# Patient Record
Sex: FEMALE
[De-identification: names, ages, dates, MRNs, and addresses within clinical notes are randomized per-mention and may not be internally consistent; named-entity substitution may affect disease eponyms.]

---

## 2021-10-14 ENCOUNTER — RESULT REVIEW (OUTPATIENT)
Age: 36
End: 2021-10-14

## 2021-11-08 PROBLEM — Z00.00 ENCOUNTER FOR PREVENTIVE HEALTH EXAMINATION: Status: ACTIVE | Noted: 2021-11-08

## 2021-11-11 ENCOUNTER — APPOINTMENT (OUTPATIENT)
Dept: ANTEPARTUM | Facility: CLINIC | Age: 36
End: 2021-11-11
Payer: COMMERCIAL

## 2021-11-11 ENCOUNTER — ASOB RESULT (OUTPATIENT)
Age: 36
End: 2021-11-11

## 2021-11-11 PROCEDURE — 76801 OB US < 14 WKS SINGLE FETUS: CPT

## 2021-11-11 PROCEDURE — 99204 OFFICE O/P NEW MOD 45 MIN: CPT | Mod: 25

## 2021-12-30 ENCOUNTER — ASOB RESULT (OUTPATIENT)
Age: 36
End: 2021-12-30

## 2021-12-30 ENCOUNTER — APPOINTMENT (OUTPATIENT)
Dept: ANTEPARTUM | Facility: CLINIC | Age: 36
End: 2021-12-30
Payer: COMMERCIAL

## 2021-12-30 ENCOUNTER — APPOINTMENT (OUTPATIENT)
Dept: ANTEPARTUM | Facility: CLINIC | Age: 36
End: 2021-12-30

## 2021-12-30 PROCEDURE — 76811 OB US DETAILED SNGL FETUS: CPT

## 2021-12-31 ENCOUNTER — TRANSCRIPTION ENCOUNTER (OUTPATIENT)
Age: 36
End: 2021-12-31

## 2022-02-03 ENCOUNTER — APPOINTMENT (OUTPATIENT)
Dept: ANTEPARTUM | Facility: CLINIC | Age: 37
End: 2022-02-03
Payer: COMMERCIAL

## 2022-02-03 ENCOUNTER — ASOB RESULT (OUTPATIENT)
Age: 37
End: 2022-02-03

## 2022-02-03 PROCEDURE — 76816 OB US FOLLOW-UP PER FETUS: CPT

## 2022-03-17 ENCOUNTER — APPOINTMENT (OUTPATIENT)
Dept: ANTEPARTUM | Facility: CLINIC | Age: 37
End: 2022-03-17
Payer: COMMERCIAL

## 2022-03-17 ENCOUNTER — ASOB RESULT (OUTPATIENT)
Age: 37
End: 2022-03-17

## 2022-03-17 PROCEDURE — 76819 FETAL BIOPHYS PROFIL W/O NST: CPT

## 2022-03-17 PROCEDURE — 76816 OB US FOLLOW-UP PER FETUS: CPT

## 2022-04-28 ENCOUNTER — APPOINTMENT (OUTPATIENT)
Dept: ANTEPARTUM | Facility: CLINIC | Age: 37
End: 2022-04-28

## 2022-05-24 ENCOUNTER — OUTPATIENT (OUTPATIENT)
Dept: OUTPATIENT SERVICES | Facility: HOSPITAL | Age: 37
LOS: 1 days | End: 2022-05-24
Payer: COMMERCIAL

## 2022-05-24 DIAGNOSIS — Z11.52 ENCOUNTER FOR SCREENING FOR COVID-19: ICD-10-CM

## 2022-05-24 PROCEDURE — U0005: CPT

## 2022-05-24 PROCEDURE — U0003: CPT

## 2022-05-24 PROCEDURE — C9803: CPT

## 2022-05-25 LAB — SARS-COV-2 RNA SPEC QL NAA+PROBE: SIGNIFICANT CHANGE UP

## 2022-05-26 ENCOUNTER — INPATIENT (INPATIENT)
Facility: HOSPITAL | Age: 37
LOS: 2 days | Discharge: HOME | End: 2022-05-29
Attending: OBSTETRICS & GYNECOLOGY | Admitting: OBSTETRICS & GYNECOLOGY
Payer: COMMERCIAL

## 2022-05-26 VITALS — OXYGEN SATURATION: 98 % | HEART RATE: 95 BPM

## 2022-05-26 DIAGNOSIS — O48.0 POST-TERM PREGNANCY: ICD-10-CM

## 2022-05-26 LAB
BASOPHILS # BLD AUTO: 0.03 K/UL — SIGNIFICANT CHANGE UP (ref 0–0.2)
BASOPHILS NFR BLD AUTO: 0.3 % — SIGNIFICANT CHANGE UP (ref 0–2)
BLD GP AB SCN SERPL QL: NEGATIVE — SIGNIFICANT CHANGE UP
EOSINOPHIL # BLD AUTO: 0.14 K/UL — SIGNIFICANT CHANGE UP (ref 0–0.5)
EOSINOPHIL NFR BLD AUTO: 1.2 % — SIGNIFICANT CHANGE UP (ref 0–6)
HCT VFR BLD CALC: 38.4 % — SIGNIFICANT CHANGE UP (ref 34.5–45)
HGB BLD-MCNC: 12.8 G/DL — SIGNIFICANT CHANGE UP (ref 11.5–15.5)
IMM GRANULOCYTES NFR BLD AUTO: 1 % — SIGNIFICANT CHANGE UP (ref 0–1.5)
LYMPHOCYTES # BLD AUTO: 1.65 K/UL — SIGNIFICANT CHANGE UP (ref 1–3.3)
LYMPHOCYTES # BLD AUTO: 14.3 % — SIGNIFICANT CHANGE UP (ref 13–44)
MCHC RBC-ENTMCNC: 31.4 PG — SIGNIFICANT CHANGE UP (ref 27–34)
MCHC RBC-ENTMCNC: 33.3 GM/DL — SIGNIFICANT CHANGE UP (ref 32–36)
MCV RBC AUTO: 94.1 FL — SIGNIFICANT CHANGE UP (ref 80–100)
MONOCYTES # BLD AUTO: 0.98 K/UL — HIGH (ref 0–0.9)
MONOCYTES NFR BLD AUTO: 8.5 % — SIGNIFICANT CHANGE UP (ref 2–14)
NEUTROPHILS # BLD AUTO: 8.64 K/UL — HIGH (ref 1.8–7.4)
NEUTROPHILS NFR BLD AUTO: 74.7 % — SIGNIFICANT CHANGE UP (ref 43–77)
NRBC # BLD: 0 /100 WBCS — SIGNIFICANT CHANGE UP (ref 0–0)
PLATELET # BLD AUTO: 291 K/UL — SIGNIFICANT CHANGE UP (ref 150–400)
RBC # BLD: 4.08 M/UL — SIGNIFICANT CHANGE UP (ref 3.8–5.2)
RBC # FLD: 13.5 % — SIGNIFICANT CHANGE UP (ref 10.3–14.5)
RH IG SCN BLD-IMP: POSITIVE — SIGNIFICANT CHANGE UP
WBC # BLD: 11.55 K/UL — HIGH (ref 3.8–10.5)
WBC # FLD AUTO: 11.55 K/UL — HIGH (ref 3.8–10.5)

## 2022-05-26 RX ORDER — AMPICILLIN TRIHYDRATE 250 MG
1 CAPSULE ORAL EVERY 4 HOURS
Refills: 0 | Status: DISCONTINUED | OUTPATIENT
Start: 2022-05-26 | End: 2022-05-27

## 2022-05-26 RX ORDER — SODIUM CHLORIDE 9 MG/ML
1000 INJECTION, SOLUTION INTRAVENOUS
Refills: 0 | Status: DISCONTINUED | OUTPATIENT
Start: 2022-05-26 | End: 2022-05-27

## 2022-05-26 RX ORDER — CITRIC ACID/SODIUM CITRATE 300-500 MG
15 SOLUTION, ORAL ORAL EVERY 6 HOURS
Refills: 0 | Status: DISCONTINUED | OUTPATIENT
Start: 2022-05-26 | End: 2022-05-27

## 2022-05-26 RX ORDER — OXYTOCIN 10 UNIT/ML
333.33 VIAL (ML) INJECTION
Qty: 20 | Refills: 0 | Status: DISCONTINUED | OUTPATIENT
Start: 2022-05-26 | End: 2022-05-27

## 2022-05-26 RX ORDER — AMPICILLIN TRIHYDRATE 250 MG
2 CAPSULE ORAL ONCE
Refills: 0 | Status: COMPLETED | OUTPATIENT
Start: 2022-05-26 | End: 2022-05-26

## 2022-05-26 RX ADMIN — Medication 108 GRAM(S): at 22:35

## 2022-05-26 RX ADMIN — Medication 200 GRAM(S): at 18:21

## 2022-05-26 NOTE — OB RN PATIENT PROFILE - FALL HARM RISK - UNIVERSAL INTERVENTIONS
Bed in lowest position, wheels locked, appropriate side rails in place/Call bell, personal items and telephone in reach/Instruct patient to call for assistance before getting out of bed or chair/Non-slip footwear when patient is out of bed/[place] to call system/Physically safe environment - no spills, clutter or unnecessary equipment/Purposeful Proactive Rounding/Room/bathroom lighting operational, light cord in reach

## 2022-05-26 NOTE — OB PROVIDER H&P - NS ATTEST RISK PROBLEM GEN_ALL_CORE FT
Note written remotely:  35 yo  presents at 40 6/7 weeks for late term labor induction. Pregnancy has been uncomplicated. She is GBS neg.  Pt was seen and admitted by the PA  Tracing reviewed remotely:  NST: Reactive, moderate variability, accels present, no decels  toco: occasional ctx  A/P: Late Term induction at 40 6/7 weeks  -GBS neg  -po cytotec  -cervical jimenes at MN  -pitocin at 4 AM  Derek Adair MD

## 2022-05-26 NOTE — OB RN PATIENT PROFILE - EDUCATION PROVIDED ON ASSESSMENT OF INFANT "FEEDING CUES" AND THE IMPORTANCE OF FEEDING "ON CUE" / "BABY-LED" FEEDINGS
Appointment scheduled for 2:30 today
Patient is not feeling well. She thinks she has a sinus infection. She is home today and would like to see him today.
Statement Selected

## 2022-05-26 NOTE — OB PROVIDER H&P - ASSESSMENT
37yo  @40w6d IOL for late term. Category I tracing. Vitals stable.    Plan:  -Admit to L&D  -Admission labs  -IVF  -EFM/[place]  -Monitor vitals  -For PO cytotec  -For cook balloon at midnight  -For pitocin @4a  -Anticipate     Plan per Dr. Mana SUE)    Odette Guzman PA-C

## 2022-05-26 NOTE — OB PROVIDER H&P - HISTORY OF PRESENT ILLNESS
35yo  @40w6d presenting for IOL for late term. Has been feeling irregular contractions. Denies LOF, vaginal bleeding. Endorses good fetal movement. Reports an otherwise uncomplicated pregnancy.    GBS positive  EFW 3400    OBHx: Primigravida  GYNHx: H/o abnormal pap smear, s/p colpo in , f/up pap smears WNL    MHx:     Mitral valve prolapse - follows with Dr. Barone ([facility]). Last saw her 2022, EKG, echo, and nonstress test WNL.   SHx: Denies  SocialHx: Denies  PsychHx: Denies    Allergies: Denies  Meds: PNV, ASA

## 2022-05-26 NOTE — OB PROVIDER H&P - NSHPPHYSICALEXAM_GEN_ALL_CORE
ICU Vital Signs Last 24 Hrs  T(C): --  T(F): --  HR: 77 (26 May 2022 17:55) (73 - 95)  BP: 127/61 (26 May 2022 17:28) (127/61 - 127/61)  BP(mean): --  ABP: --  ABP(mean): --  RR: --  SpO2: 99% (26 May 2022 17:55) (93% - 99%)    Physical Exam:  General: NAD, well appearing  Heart: RRR  Lungs: CTA b/l  Abdomen: Gravid, NT, NR, NG  Extremities: No calf tenderness b/l    SVE: 0/50/-3    Sono (Dr. Masters): Vertex

## 2022-05-27 LAB
COVID-19 SPIKE DOMAIN AB INTERP: POSITIVE
COVID-19 SPIKE DOMAIN ANTIBODY RESULT: >250 U/ML — HIGH
SARS-COV-2 IGG+IGM SERPL QL IA: >250 U/ML — HIGH
SARS-COV-2 IGG+IGM SERPL QL IA: POSITIVE
T PALLIDUM AB TITR SER: NEGATIVE — SIGNIFICANT CHANGE UP

## 2022-05-27 RX ORDER — OXYTOCIN 10 UNIT/ML
4 VIAL (ML) INJECTION
Qty: 30 | Refills: 0 | Status: DISCONTINUED | OUTPATIENT
Start: 2022-05-27 | End: 2022-05-29

## 2022-05-27 RX ORDER — DIBUCAINE 1 %
1 OINTMENT (GRAM) RECTAL EVERY 6 HOURS
Refills: 0 | Status: DISCONTINUED | OUTPATIENT
Start: 2022-05-27 | End: 2022-05-29

## 2022-05-27 RX ORDER — OXYTOCIN 10 UNIT/ML
10 VIAL (ML) INJECTION ONCE
Refills: 0 | Status: COMPLETED | OUTPATIENT
Start: 2022-05-27 | End: 2022-05-27

## 2022-05-27 RX ORDER — DIPHENHYDRAMINE HCL 50 MG
25 CAPSULE ORAL EVERY 6 HOURS
Refills: 0 | Status: DISCONTINUED | OUTPATIENT
Start: 2022-05-27 | End: 2022-05-29

## 2022-05-27 RX ORDER — OXYCODONE HYDROCHLORIDE 5 MG/1
5 TABLET ORAL ONCE
Refills: 0 | Status: DISCONTINUED | OUTPATIENT
Start: 2022-05-27 | End: 2022-05-29

## 2022-05-27 RX ORDER — TETANUS TOXOID, REDUCED DIPHTHERIA TOXOID AND ACELLULAR PERTUSSIS VACCINE, ADSORBED 5; 2.5; 8; 8; 2.5 [IU]/.5ML; [IU]/.5ML; UG/.5ML; UG/.5ML; UG/.5ML
0.5 SUSPENSION INTRAMUSCULAR ONCE
Refills: 0 | Status: DISCONTINUED | OUTPATIENT
Start: 2022-05-27 | End: 2022-05-29

## 2022-05-27 RX ORDER — SODIUM CHLORIDE 9 MG/ML
3 INJECTION INTRAMUSCULAR; INTRAVENOUS; SUBCUTANEOUS EVERY 8 HOURS
Refills: 0 | Status: DISCONTINUED | OUTPATIENT
Start: 2022-05-27 | End: 2022-05-29

## 2022-05-27 RX ORDER — ACETAMINOPHEN 500 MG
975 TABLET ORAL
Refills: 0 | Status: DISCONTINUED | OUTPATIENT
Start: 2022-05-27 | End: 2022-05-29

## 2022-05-27 RX ORDER — OXYCODONE HYDROCHLORIDE 5 MG/1
5 TABLET ORAL
Refills: 0 | Status: DISCONTINUED | OUTPATIENT
Start: 2022-05-27 | End: 2022-05-29

## 2022-05-27 RX ORDER — BENZOCAINE 10 %
1 GEL (GRAM) MUCOUS MEMBRANE EVERY 6 HOURS
Refills: 0 | Status: DISCONTINUED | OUTPATIENT
Start: 2022-05-27 | End: 2022-05-29

## 2022-05-27 RX ORDER — AER TRAVELER 0.5 G/1
1 SOLUTION RECTAL; TOPICAL EVERY 4 HOURS
Refills: 0 | Status: DISCONTINUED | OUTPATIENT
Start: 2022-05-27 | End: 2022-05-29

## 2022-05-27 RX ORDER — OXYTOCIN 10 UNIT/ML
333.33 VIAL (ML) INJECTION
Qty: 20 | Refills: 0 | Status: DISCONTINUED | OUTPATIENT
Start: 2022-05-27 | End: 2022-05-29

## 2022-05-27 RX ORDER — KETOROLAC TROMETHAMINE 30 MG/ML
30 SYRINGE (ML) INJECTION ONCE
Refills: 0 | Status: DISCONTINUED | OUTPATIENT
Start: 2022-05-27 | End: 2022-05-29

## 2022-05-27 RX ORDER — MAGNESIUM HYDROXIDE 400 MG/1
30 TABLET, CHEWABLE ORAL
Refills: 0 | Status: DISCONTINUED | OUTPATIENT
Start: 2022-05-27 | End: 2022-05-29

## 2022-05-27 RX ORDER — IBUPROFEN 200 MG
600 TABLET ORAL EVERY 6 HOURS
Refills: 0 | Status: COMPLETED | OUTPATIENT
Start: 2022-05-27 | End: 2023-04-25

## 2022-05-27 RX ORDER — LANOLIN
1 OINTMENT (GRAM) TOPICAL EVERY 6 HOURS
Refills: 0 | Status: DISCONTINUED | OUTPATIENT
Start: 2022-05-27 | End: 2022-05-29

## 2022-05-27 RX ORDER — SIMETHICONE 80 MG/1
80 TABLET, CHEWABLE ORAL EVERY 4 HOURS
Refills: 0 | Status: DISCONTINUED | OUTPATIENT
Start: 2022-05-27 | End: 2022-05-29

## 2022-05-27 RX ORDER — HYDROCORTISONE 1 %
1 OINTMENT (GRAM) TOPICAL EVERY 6 HOURS
Refills: 0 | Status: DISCONTINUED | OUTPATIENT
Start: 2022-05-27 | End: 2022-05-29

## 2022-05-27 RX ORDER — PRAMOXINE HYDROCHLORIDE 150 MG/15G
1 AEROSOL, FOAM RECTAL EVERY 4 HOURS
Refills: 0 | Status: DISCONTINUED | OUTPATIENT
Start: 2022-05-27 | End: 2022-05-29

## 2022-05-27 RX ADMIN — SODIUM CHLORIDE 125 MILLILITER(S): 9 INJECTION, SOLUTION INTRAVENOUS at 04:30

## 2022-05-27 RX ADMIN — Medication 108 GRAM(S): at 15:25

## 2022-05-27 RX ADMIN — Medication 108 GRAM(S): at 11:15

## 2022-05-27 RX ADMIN — Medication 108 GRAM(S): at 06:30

## 2022-05-27 RX ADMIN — Medication 4 MILLIUNIT(S)/MIN: at 13:45

## 2022-05-27 RX ADMIN — Medication 10 UNIT(S): at 21:32

## 2022-05-27 RX ADMIN — Medication 108 GRAM(S): at 02:30

## 2022-05-27 RX ADMIN — Medication 108 GRAM(S): at 19:30

## 2022-05-27 NOTE — OB RN DELIVERY SUMMARY - NSSELHIDDEN_OBGYN_ALL_OB_FT
[NS_DeliveryAttending1_OBGYN_ALL_OB_FT:[id number]],[NS_DeliveryRN_OBGYN_ALL_OB_FT:[id number]]

## 2022-05-27 NOTE — OB PROVIDER LABOR PROGRESS NOTE - NS_OBIHIFHRDETAILS_OBGYN_ALL_OB_FT
120/mod/+accels no decels
140's moderate variability + accels no decels
120/mod/+accels no decels
140/mod/+acc/-dec
125/mod/-acc/-dec

## 2022-05-27 NOTE — OB PROVIDER LABOR PROGRESS NOTE - NS_SUBJECTIVE/OBJECTIVE_OBGYN_ALL_OB_FT
OB attending progress note     Patient signed out to me by Dr. Adair overnight    35yo  @40w6d presenting for IOL for late term. Has been feeling irregular contractions. Denies LOF, vaginal bleeding. Endorses good fetal movement. Reports an otherwise uncomplicated pregnancy.  OB issues:   -Hx MVP, follows with cardiologist, evaluated for chest pain a few times this pregnancy, work up unremarkable  -Hx of Trisomy X on NIPT, s/p genetics, declined invasive testing, sonograms wnl  -Hx of GBS pos    Vital Signs Last 24 Hrs  T(C): 36.7 (27 May 2022 10:08), Max: 37 (26 May 2022 18:22)  T(F): 98.06 (27 May 2022 10:08), Max: 98.6 (26 May 2022 18:22)  HR: 81 (27 May 2022 11:35) (58 - 107)  BP: 110/68 (27 May 2022 11:35) (107/66 - 132/66)  BP(mean): --  RR: 18 (27 May 2022 02:54) (17 - 18)  SpO2: 98% (27 May 2022 11:35) (90% - 100%)    SVE deferred  EFW 3700g    Labs reviewed:                         12.8   11.55 )-----------( 291      ( 26 May 2022 19:29 )             38.4     O+/ab neg   GBS pos  Covid neg
pt due for exam.
OB attending Progress note     Patient examined for cervical change    Vital Signs Last 24 Hrs  T(C): 36.7 (27 May 2022 15:26), Max: 37 (26 May 2022 18:22)  T(F): 98.06 (27 May 2022 15:26), Max: 98.6 (26 May 2022 18:22)  HR: 86 (27 May 2022 16:45) (58 - 107)  BP: 124/60 (27 May 2022 16:36) (92/54 - 143/93)  BP(mean): --  RR: 18 (27 May 2022 02:54) (17 - 18)  SpO2: 99% (27 May 2022 16:45) (90% - 100%)    SVE, balloon out, AROM, clear
Patient comfortable    T(C): 36.7 (05-27-22 @ 10:08), Max: 37 (05-26-22 @ 18:22)  HR: 70 (05-27-22 @ 13:15) (58 - 107)  BP: 122/77 (05-27-22 @ 13:05) (92/54 - 132/66)  RR: 18 (05-27-22 @ 02:54) (17 - 18)  SpO2: 99% (05-27-22 @ 13:15) (90% - 100%)
Pt due for cervical balloon.
ob attending progess    feeling urge to poop

## 2022-05-27 NOTE — OB RN DELIVERY SUMMARY - NS_SEPSISRSKCALC_OBGYN_ALL_OB_FT
EOS calculated successfully. EOS Risk Factor: 0.5/1000 live births ([facility] national incidence); GA=41w;Temp=98.6; ROM=4.75; GBS='Positive'; Antibiotics='Broad spectrum antibiotics > 4 hrs prior to birth'

## 2022-05-27 NOTE — OB PROVIDER LABOR PROGRESS NOTE - ASSESSMENT
37yo P0 @ 41w 0d admitted for LT IOL  - fetal status  - cat 1  - GBS + --> continue ampicilin  - epidural in place  - cervical balloon placed and will start pitocin augmentation  D/W Dr Levon Carr PA-C
In active labor,   reassess 1-2h    Jessie Dos Santos MD 
37 yo  at 41w0d IOL for late term  now s/p FB and AROM   -continue pitocin   -cat 1 tracing   -gbs pos, on amp  -epidural in place, for top offs PRKATHERYN Dos Santos MD 
37yo  @40w6d presenting for IOL for late term.  -for pitocin with FB (unable to insert all night due to pain, patient refused epidural until now)  -epidural in place  -cat 1 tracing  -GBS pos on amp  -CONSENTS SIGNED, ALL QUESTIONS ANSWERED    Jessie Dos Santos MD 
Pt has difficulty with cervical exam. Offered epidural, declines.  Would like to continue with po cytotec over epi/CB/pitocin at this time.    - Cont PO cytotec.    Amyeo Afroz Jereen, PGY-1  OB team aware.
Failed attempt at CB. Cervix to posterior left. Firm channel to internal os.  Attempted w/ speculum, unable to center cervix due to patient discomfort.    - Cont w/ PO cytotec.   - Reattempt CB at 5am.  - Offered epidural, patient rather wait.    Amyeo Afroz Jereen, PGY-1  d/w OB team.

## 2022-05-28 ENCOUNTER — TRANSCRIPTION ENCOUNTER (OUTPATIENT)
Age: 37
End: 2022-05-28

## 2022-05-28 LAB
HCT VFR BLD CALC: 31.3 % — LOW (ref 34.5–45)
HGB BLD-MCNC: 10.3 G/DL — LOW (ref 11.5–15.5)

## 2022-05-28 RX ORDER — IBUPROFEN 200 MG
600 TABLET ORAL EVERY 6 HOURS
Refills: 0 | Status: DISCONTINUED | OUTPATIENT
Start: 2022-05-28 | End: 2022-05-29

## 2022-05-28 RX ADMIN — Medication 975 MILLIGRAM(S): at 02:11

## 2022-05-28 RX ADMIN — Medication 1 TABLET(S): at 12:23

## 2022-05-28 RX ADMIN — Medication 600 MILLIGRAM(S): at 12:23

## 2022-05-28 RX ADMIN — Medication 975 MILLIGRAM(S): at 15:37

## 2022-05-28 RX ADMIN — Medication 600 MILLIGRAM(S): at 23:23

## 2022-05-28 RX ADMIN — SODIUM CHLORIDE 3 MILLILITER(S): 9 INJECTION INTRAMUSCULAR; INTRAVENOUS; SUBCUTANEOUS at 21:25

## 2022-05-28 RX ADMIN — Medication 975 MILLIGRAM(S): at 02:50

## 2022-05-28 RX ADMIN — Medication 600 MILLIGRAM(S): at 23:53

## 2022-05-28 RX ADMIN — Medication 975 MILLIGRAM(S): at 21:40

## 2022-05-28 RX ADMIN — Medication 975 MILLIGRAM(S): at 15:07

## 2022-05-28 RX ADMIN — Medication 600 MILLIGRAM(S): at 18:43

## 2022-05-28 RX ADMIN — Medication 975 MILLIGRAM(S): at 21:10

## 2022-05-28 RX ADMIN — Medication 600 MILLIGRAM(S): at 12:53

## 2022-05-28 RX ADMIN — Medication 600 MILLIGRAM(S): at 07:00

## 2022-05-28 RX ADMIN — Medication 600 MILLIGRAM(S): at 06:15

## 2022-05-28 RX ADMIN — Medication 600 MILLIGRAM(S): at 18:13

## 2022-05-28 RX ADMIN — Medication 975 MILLIGRAM(S): at 09:45

## 2022-05-28 RX ADMIN — Medication 975 MILLIGRAM(S): at 09:15

## 2022-05-28 NOTE — PROGRESS NOTE ADULT - PROBLEM SELECTOR PLAN 1
Increase OOB  Regular diet  PO Pain protocol  Follow up AM CBC  Routine Postpartum Care    Tarah Horan FNP-BC

## 2022-05-28 NOTE — DISCHARGE NOTE OB - PATIENT PORTAL LINK FT
You can access the FollowMyHealth Patient Portal offered by [facility] by registering at the following website: [email or web address]. By joining [facility]’s FollowMyHealth portal, you will also be able to view your health information using other applications (apps) compatible with our system.

## 2022-05-28 NOTE — OB PROVIDER DELIVERY SUMMARY - NSSELHIDDEN_OBGYN_ALL_OB_FT
[NS_DeliveryAttending1_OBGYN_ALL_OB_FT:[id number]],[NS_DeliveryRN_OBGYN_ALL_OB_FT:[email or web address]=]

## 2022-05-28 NOTE — OB PROVIDER DELIVERY SUMMARY - NSPROVIDERDELIVERYNOTE_OBGYN_ALL_OB_FT
Patient pushing over intact perineum, live female delivered from CELIA position.  CAN x 1, reduced after delivery.   Delayed cord clamping x 1 min.   Placenta delivered spontaneously and intact with 3VC. Uterus explored, atony noted, given IM pitocin.  No evidence of retained placenta.  Additional bleeding noted, cytotec given.   Cervix and sulci intact.   Second degree laceration repaired as above.   EBL 500cc.

## 2022-05-28 NOTE — DISCHARGE NOTE OB - MEDICATION SUMMARY - MEDICATIONS TO TAKE
I will START or STAY ON the medications listed below when I get home from the hospital:    acetaminophen 325 mg oral tablet  -- 2 tab(s) by mouth every 6 hours  -- Indication: For Normal spontaneous vaginal delivery    ibuprofen 600 mg oral tablet  -- 1 tab(s) by mouth every 6 hours  -- Indication: For Normal spontaneous vaginal delivery    Prenatal Multivitamins with Folic Acid 1 mg oral tablet  -- 1 tab(s) by mouth once a day  -- Indication: For Normal spontaneous vaginal delivery

## 2022-05-28 NOTE — DISCHARGE NOTE OB - MATERIALS PROVIDED
[facility] [place] Screening Program/Breastfeeding Log/Bottle Feeding Log/Breastfeeding Mother’s Support Group Information/Guide to Postpartum Care/[facility] Hearing Screen Program/Back To Sleep Handout/Shaken Baby Prevention Handout/Breastfeeding Guide and Packet/Birth Certificate Instructions/Discharge Medication Information for Patients and Families Pocket Guide

## 2022-05-28 NOTE — DISCHARGE NOTE OB - HOSPITAL COURSE
Patient admitted for induction of labor and had an uncomplicated vaginal delivery.  Patient had an unremarkable postpartum course and was stable for discharge home on postpartum day 2.

## 2022-05-28 NOTE — PROGRESS NOTE ADULT - ASSESSMENT
A/P:  36y  PPD # 1 P1  S/P  w PPH () doing well    PMHx:  Current Issues: None  PAST MEDICAL & SURGICAL HISTORY

## 2022-05-28 NOTE — DISCHARGE NOTE OB - CARE PLAN
Principal Discharge DX:	Vaginal delivery  Assessment and plan of treatment:	After discharge, please stay on pelvic rest for 6 weeks, meaning no sexual intercourse, no tampons and no douching.  No driving for 2 weeks as women can loose a lot of blood during delivery and there is a possibility of being lightheaded/fainting.  No lifting objects heavier than baby for two weeks.  Expect to have vaginal bleeding/spotting for up to six weeks.  The bleeding should get lighter and more white/light brown with time.  For bleeding soaking more than a pad an hour or passing clots greater than the size of your fist, come in to the emergency department.   1

## 2022-05-29 VITALS
HEART RATE: 88 BPM | TEMPERATURE: 98 F | RESPIRATION RATE: 17 BRPM | DIASTOLIC BLOOD PRESSURE: 85 MMHG | SYSTOLIC BLOOD PRESSURE: 121 MMHG | OXYGEN SATURATION: 96 %

## 2022-05-29 PROCEDURE — 86850 RBC ANTIBODY SCREEN: CPT

## 2022-05-29 PROCEDURE — 36415 COLL VENOUS BLD VENIPUNCTURE: CPT

## 2022-05-29 PROCEDURE — 86769 SARS-COV-2 COVID-19 ANTIBODY: CPT

## 2022-05-29 PROCEDURE — 86901 BLOOD TYPING SEROLOGIC RH(D): CPT

## 2022-05-29 PROCEDURE — 59025 FETAL NON-STRESS TEST: CPT

## 2022-05-29 PROCEDURE — 85014 HEMATOCRIT: CPT

## 2022-05-29 PROCEDURE — 86780 TREPONEMA PALLIDUM: CPT

## 2022-05-29 PROCEDURE — 85025 COMPLETE CBC W/AUTO DIFF WBC: CPT

## 2022-05-29 PROCEDURE — 86900 BLOOD TYPING SEROLOGIC ABO: CPT

## 2022-05-29 PROCEDURE — 85018 HEMOGLOBIN: CPT

## 2022-05-29 PROCEDURE — 59050 FETAL MONITOR W/REPORT: CPT

## 2022-05-29 RX ORDER — ACETAMINOPHEN 500 MG
2 TABLET ORAL
Qty: 0 | Refills: 0 | DISCHARGE
Start: 2022-05-29

## 2022-05-29 RX ORDER — IBUPROFEN 200 MG
1 TABLET ORAL
Qty: 0 | Refills: 0 | DISCHARGE
Start: 2022-05-29

## 2022-05-29 RX ADMIN — SODIUM CHLORIDE 3 MILLILITER(S): 9 INJECTION INTRAMUSCULAR; INTRAVENOUS; SUBCUTANEOUS at 05:29

## 2022-05-29 RX ADMIN — Medication 600 MILLIGRAM(S): at 05:54

## 2022-05-29 RX ADMIN — Medication 975 MILLIGRAM(S): at 02:26

## 2022-05-29 RX ADMIN — Medication 975 MILLIGRAM(S): at 10:01

## 2022-05-29 RX ADMIN — Medication 975 MILLIGRAM(S): at 15:43

## 2022-05-29 RX ADMIN — Medication 975 MILLIGRAM(S): at 09:31

## 2022-05-29 RX ADMIN — Medication 600 MILLIGRAM(S): at 12:39

## 2022-05-29 RX ADMIN — Medication 600 MILLIGRAM(S): at 12:09

## 2022-05-29 RX ADMIN — Medication 975 MILLIGRAM(S): at 02:56

## 2022-05-29 RX ADMIN — Medication 600 MILLIGRAM(S): at 06:24

## 2022-05-29 RX ADMIN — Medication 975 MILLIGRAM(S): at 15:13

## 2022-05-29 NOTE — PROGRESS NOTE ADULT - SUBJECTIVE AND OBJECTIVE BOX
OB Attending Note    S: Patient doing well. Minimal lochia. Pain controlled.    O: Vital Signs Last 24 Hrs  T(C): 36.4 (29 May 2022 05:00), Max: 36.7 (28 May 2022 21:00)  T(F): 97.6 (29 May 2022 05:00), Max: 98 (28 May 2022 21:00)  HR: 88 (29 May 2022 05:00) (82 - 88)  BP: 121/85 (29 May 2022 05:00) (115/76 - 121/85)      Gen: NAD  Abd: soft, NT, ND, fundus firm below umbilicus  Lochia: min  Perineum healing well  Ext: no tenderness    Labs:                        10.3   x     )-----------( x        ( 28 May 2022 07:56 )             31.3       A: 36y PPD# 2 s/p  doing well.    Plan:  Discharge instructions reviewed, pain control with NSAIDS/continue PNVs, nothing per vagina x6 weeks  f/u 6 weeks for pp check, call with issues or concerns  
OB Attending Note    S: Patient doing well. Minimal lochia. Pain controlled.    O: Vital Signs Last 24 Hrs  T(C): 36.8 (28 May 2022 05:00), Max: 37 (28 May 2022 00:05)  T(F): 98.2 (28 May 2022 05:00), Max: 98.6 (28 May 2022 00:05)  HR: 90 (28 May 2022 05:00) (63 - 162)  BP: 129/73 (28 May 2022 05:00) (92/54 - 170/77)      Gen: NAD  Abd: soft, NT, ND, fundus firm below umbilicus  Lochia: min  Perineum healing well  Ext: no tenderness    Labs:                        10.3   x     )-----------( x        ( 28 May 2022 07:56 )             31.3       A: 36y PPD# 1 s/p  doing well.    Plan: cont PP care  OOB/ambulation  Pain control
Postpartum Note- PPD#1    Allergies: No Known Allergies    Blood Type O   Positive    RPR : Negative    Rubella: Immune      S: Patient is a  36y  PPD#1  S/P    Patient w/o complaints, pain is controlled.    Pt is OOB, tolerating PO, passing flatus. Lochia WNL.     Feeding: Breast/Bottle    O:  Vital Signs Last 24 Hrs  T(C): 36.8 (28 May 2022 05:00), Max: 37 (28 May 2022 00:05)  T(F): 98.2 (28 May 2022 05:00), Max: 98.6 (28 May 2022 00:05)  HR: 90 (28 May 2022 05:00) (63 - 162)  BP: 129/73 (28 May 2022 05:00) (92/54 - 170/77)  BP(mean): 88 (28 May 2022 00:05) (88 - 99)  RR: 17 (28 May 2022 05:00) (17 - 18)  SpO2: 97% (28 May 2022 05:00) (88% - 100%)     Gen: NAD  Abdomen: Soft, nontender, non-distended, fundus firm.  Vaginal: Lochia WNL  Ext: Neg calf tenderness, negative edema

## 2022-06-14 ENCOUNTER — NON-APPOINTMENT (OUTPATIENT)
Age: 37
End: 2022-06-14

## 2022-06-30 ENCOUNTER — NON-APPOINTMENT (OUTPATIENT)
Age: 37
End: 2022-06-30

## 2022-11-29 ENCOUNTER — NON-APPOINTMENT (OUTPATIENT)
Age: 37
End: 2022-11-29

## 2024-09-28 NOTE — DISCHARGE NOTE OB - CARE PROVIDER_API CALL
Beside report completed, room safety checklist completed with mj gamble   Jessie Dos Santos)  Obstetrics and Gynecology  [address], [address]  [place] [address]  Phone: [phone]  Fax: [phone]  Follow Up Time: Routine

## 2025-08-22 ENCOUNTER — APPOINTMENT (OUTPATIENT)
Dept: OBGYN | Facility: CLINIC | Age: 40
End: 2025-08-22

## 2025-09-21 ENCOUNTER — NON-APPOINTMENT (OUTPATIENT)
Age: 40
End: 2025-09-21